# Patient Record
Sex: FEMALE | Race: WHITE
[De-identification: names, ages, dates, MRNs, and addresses within clinical notes are randomized per-mention and may not be internally consistent; named-entity substitution may affect disease eponyms.]

---

## 2017-02-14 ENCOUNTER — HOSPITAL ENCOUNTER (EMERGENCY)
Dept: HOSPITAL 61 - ER | Age: 42
Discharge: HOME | End: 2017-02-14
Payer: MEDICAID

## 2017-02-14 VITALS — BODY MASS INDEX: 34.15 KG/M2 | WEIGHT: 200 LBS | HEIGHT: 64 IN

## 2017-02-14 VITALS — DIASTOLIC BLOOD PRESSURE: 96 MMHG | SYSTOLIC BLOOD PRESSURE: 166 MMHG
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
## 2017-02-14 DIAGNOSIS — S63.602A: Primary | ICD-10-CM

## 2017-02-14 DIAGNOSIS — I10: ICD-10-CM
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
## 2017-02-14 PROCEDURE — 73130 X-RAY EXAM OF HAND: CPT

## 2017-02-14 PROCEDURE — 29125 APPL SHORT ARM SPLINT STATIC: CPT

## 2017-02-14 NOTE — PHYS DOC
Past Medical History


Past Medical History:  Hypertension, UTI


Past Surgical History:  No Surgical History


Additional Past Surgical Histo:  FX LEFT ANKLE PIN/PLATES


Alcohol Use:  None


Drug Use:  None





Adult General


Chief Complaint


Chief Complaint:  HAND PROBLEM





HPI


HPI


Patient is a 41  year old female who presents with left hand injury. Patient 

reports she is walking in the parking lot around 2 or 3 in the afternoon when 

she tripped over a curb and fell, hitting her left hand on the ground. She did 

not hit her head, no loss is consciousness. She presents now with pain in her 

left thumb. She denies pain elsewhere. She has not taken anything for pain. No 

other acute complaints.





Review of Systems


Review of Systems


Constitutional: Denies fever or chills


Respiratory: Denies cough or shortness of breath 


Cardiovascular: Denies chest pain


GI: Denies abdominal pain, nausea, vomiting, or diarrhea 


Musculoskeletal: L thumb pain


Neurologic: Denies headache, focal weakness or sensory changes





Current Medications


Current Medications





 Current Medications








 Medications


  (Trade)  Dose


 Ordered  Sig/Vadim  Start Time


 Stop Time Status Last Admin


Dose Admin


 


 Naproxen


  (Naprosyn)  500 mg  1X  ONCE  2/14/17 02:30


 2/14/17 02:31 DC 2/14/17 02:33


500 MG











Allergies


Allergies





 Allergies








Coded Allergies Type Severity Reaction Last Updated Verified


 


  No Known Drug Allergies    8/11/16 No











Physical Exam


Physical Exam


Constitutional: Well developed, well nourished, no acute distress, non-toxic 

appearance


HENT: Normocephalic, atraumatic


Eyes: EOMI, conjunctiva normal, no discharge


Neck: No stridor


Pulmonary: No respiratory distress


Skin: Warm, dry


Musculoskeletal: L thumb bruised, generally TTP over phalanges; no TTP in 

anatomic snuffbox; motor function (flexion/extension) intact; 2+ radial pulse, 

sensation to light touch intact


Neurologic: Alert and oriented X 3


Psychologic: Affect normal, judgement normal, mood normal





Current Patient Data


Vital Signs





 Vital Signs








  Date Time  Temp Pulse Resp B/P Pulse Ox O2 Delivery O2 Flow Rate FiO2


 


2/14/17 03:11  79 16 166/96 97 Room Air  


 


2/14/17 01:55 97.9       





 97.9       











EKG


EKG


[]





Radiology/Procedures


Radiology/Procedures


X-ray L hand (my read):


No acute fracture





Course & Med Decision Making


Course & Med Decision Making


Pertinent Labs and Imaging studies reviewed. (See chart for details)


Patient is 41-year-old female who presents with left thumb injury. Possible 

fracture versus sprain. We'll obtain x-rays to evaluate. Dose of naproxen 

ordered for pain control. X-ray without acute fracture per my read. Regardless 

will still splint with an aluminum splint over the thumb. Patient discharged 

with instructions for follow-up and return precautions.





Dragon Disclaimer


Dragon Disclaimer


This electronic medical record was generated, in whole or in part, using a 

voice recognition dictation system.





Departure


Departure


Impression:  


 Primary Impression:  


 Left thumb sprain


Disposition:  01 HOME, SELF-CARE


Condition:  STABLE


Referrals:  


MARION ROJAS MD (PCP)


Patient Instructions:  Thumb Sprain





Additional Instructions:


Thank you for allowing us to provide care today in the Emergency Department.


You can use naproxen, ibuprofen, or acetaminophen for any pain. Follow the 

directions on the label.


Schedule a follow up appointment with your primary care doctor. 


Return promptly to the Emergency Department if you develop any new or 

concerning symptoms.








NAYELI PATEL MD Feb 14, 2017 02:09

## 2017-02-14 NOTE — RAD
Portable left hand, 3 views, 2/14/2017:



History: Pain after a fall



There is mild cortical deformity along the margins of the proximal aspect of

the distal phalanx of the thumb. The appearance suggests a nondisplaced

fracture, of unclear age. No other fracture or dislocation is identified.





IMPRESSION: Nondisplaced fracture of the distal phalanx of the thumb, of

indeterminate age. If clinically indicated follow-up radiographs in 7-10 days

may be useful for further evaluation.







Note: The findings were called to personnel in the [facility] ER at 8:36 AM on

2/14/2017.

## 2019-01-02 ENCOUNTER — HOSPITAL ENCOUNTER (EMERGENCY)
Dept: HOSPITAL 61 - ER | Age: 44
Discharge: HOME | End: 2019-01-02
Payer: SELF-PAY

## 2019-01-02 VITALS — BODY MASS INDEX: 28.63 KG/M2 | WEIGHT: 200 LBS | HEIGHT: 70 IN

## 2019-01-02 VITALS — SYSTOLIC BLOOD PRESSURE: 168 MMHG | DIASTOLIC BLOOD PRESSURE: 82 MMHG

## 2019-01-02 VITALS — DIASTOLIC BLOOD PRESSURE: 60 MMHG | SYSTOLIC BLOOD PRESSURE: 158 MMHG

## 2019-01-02 DIAGNOSIS — S43.014A: Primary | ICD-10-CM
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
## 2019-01-02 DIAGNOSIS — S43.034A: ICD-10-CM

## 2019-01-02 DIAGNOSIS — I10: ICD-10-CM
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
## 2019-01-02 PROCEDURE — 96374 THER/PROPH/DIAG INJ IV PUSH: CPT

## 2019-01-02 PROCEDURE — 23650 CLTX SHO DSLC W/MNPJ WO ANES: CPT

## 2019-01-02 PROCEDURE — 73030 X-RAY EXAM OF SHOULDER: CPT

## 2019-01-02 PROCEDURE — 99285 EMERGENCY DEPT VISIT HI MDM: CPT

## 2019-01-02 NOTE — RAD
EXAM: AP and scapular Y views right shoulder

 

DATE: 1/2/2019 7:08 AM

 

INDICATION: RT SHOULDER PAIN/DISLOCATION AFTER FALL TODAY

 

COMPARISON: No Prior

 

FINDINGS/

IMPRESSION:

Anterior-inferior right glenohumeral joint dislocation. No definite 

associated fracture is seen although would be better assessed on post 

reduction images.

 

Electronically signed by: Som Sanders MD (1/2/2019 7:41 AM) [facility]

## 2019-01-02 NOTE — RAD
Right shoulder radiograph 1/2/2019 7:44 AM

 

INDICATION: Postreduction of the right shoulder

 

COMPARISON: Right shoulder radiograph January 2, 2018 at 0708 hours

 

TECHNIQUE:  2 views of the right shoulder are provided.

 

FINDINGS:

 

Interval reduction of the humeral head at the glenohumeral joint. 

Acromioclavicular joint appears well aligned. There may be subtle 

concavity of the posterior lateral right humeral head suggestive of a 

Hill-Sachs deformity. Mineralization along the lateral margin of the 

humeral head may reflect calcific tendinitis. Adjacent ribs are intact. No

significant soft tissue abnormality is identified.

 

IMPRESSION:

 

There may be subtle concavity of the posterior lateral right humeral head 

suggestive of a Hill-Sachs deformity status post reduction. Internal and 

external rotation views may be of benefit.

 

Mineralization along the lateral margin of the humeral head may reflect 

calcific tendinitis. 

 

Electronically signed by: Kayy Clark MD (1/2/2019 8:26 AM) 

[id number]

## 2019-01-02 NOTE — PHYS DOC
Past Medical History


Past Medical History:  Hypertension, UTI


Past Surgical History:  No Surgical History, Cholecystectomy


Additional Past Surgical Histo:  FX LEFT ANKLE PIN/PLATES, LEEP,


Alcohol Use:  None


Drug Use:  None





Adult General


Chief Complaint


Chief Complaint:  SHOULDER INJURY





HPI


HPI


Patient is a 43-year-old female presents to the emergency department for 

evaluation. She states that she stumbled, trying to avoid some boxes at the top 

of the stairs, and fell down 5 stairs and in the process dislocated her right 

shoulder. She has had 2 prior dislocations of her right shoulder, although not 

for 10 years. She denies any other painful areas or injuries. She denies any 

headache or neck pain, numbness, or weakness. There are no alleviating or 

exacerbating factors to her symptoms, except that movement of her right 

shoulder seems to worsen her pain.





Review of Systems


Review of Systems





Constitutional: Denies fever or chills []


Eyes: Denies change in visual acuity, redness, or eye pain []


HENT: Denies nasal congestion or sore throat []


Respiratory: Denies cough or shortness of breath []


Cardiovascular: The patient denies any shortness of breath, chest pain, 

palpitations, or orthopnea []


GI: Denies abdominal pain, nausea, vomiting, bloody stools or diarrhea []


: Denies dysuria or hematuria []


Musculoskeletal: Denies back pain or joint pain, except as noted in the history 

of present illness []


Integument: Denies rash or skin lesions []


Neurologic: Denies headache, focal weakness or sensory changes []


Endocrine: Denies polyuria or polydipsia []





All other systems were reviewed and found to be within normal limits, except as 

documented in this note.





Current Medications


Current Medications





Current Medications








 Medications


  (Trade)  Dose


 Ordered  Sig/Vadim  Start Time


 Stop Time Status Last Admin


Dose Admin


 


 Fentanyl Citrate


  (Fentanyl 2ml


 Vial)  100 mcg  1X  ONCE  1/2/19 08:00


 1/2/19 08:06 DC  





 


 Propofol  20 ml @ As


 Directed  [facility] ONCE  1/2/19 07:36


 1/2/19 07:38 DC  





 


 Sodium Chloride  1,000 ml @ 


 1,000 mls/hr  Q1H  1/2/19 07:59


 1/2/19 08:03 DC  














Allergies


Allergies





Allergies








Coded Allergies Type Severity Reaction Last Updated Verified


 


  No Known Drug Allergies    8/11/16 No











Physical Exam


Physical Exam


PHYSICAL EXAM:





CONSTITUTIONAL: Well developed, well nourished


HEAD: normocephalic, atraumatic


EENT: PERRL, EOMI. Conjunctivae normal color, sclerae non-icteric; moist mucous 

membranes.


NECK: Supple, non-tender; no meningismus.There is full, painless range of 

motion of the cervical spine, without any focal bony midline tenderness to 

palpation.


LUNGS: Lungs CTA, breathing even and unlabored. Normal air movement.


HEART: Regular rate and rhythm, no murmur


CHEST: No deformity; non-tender


ABDOMEN: The abdomen is soft, and non-tender, no masses or bruits.


EXTREM: Range of motion in the right shoulder is limited secondary to pain. 

There is fullness of the anterior aspect of the shoulder with flattening of the 

right deltoid suggestive of an anterior/inferior dislocation. Deltoid sensation 

is intact, normal range of motion in the right hand is present. The remainder 

the right upper extremity, except for the shoulder, is nontender. There is a 

strong radial pulse on the right. The remainder the extremities are unremarkable

, and atraumatic, with Normal ROM; no deformity, no calf tenderness. Normal 

pulses palpable in all extremities. There is no pedal edema.


SKIN: No rash; no diaphoresis


NEURO: Alert; normal speech and cognition; CN's grossly intact; strength 

grossly intact without focal deficit.


BACK: No CVA TTP.There is no bony tenderness to palpation of the thoracic or 

lumbar spine.





Current Patient Data


Vital Signs





 Vital Signs








  Date Time  Temp Pulse Resp B/P (MAP) Pulse Ox O2 Delivery O2 Flow Rate FiO2


 


1/2/19 07:23   19  99 Room Air  


 


1/2/19 06:33 98.2 74  160/87 (111)    





 98.2       











EKG


EKG


[]





Radiology/Procedures


Radiology/Procedures


[PROCEDURE: SHOULDER 2+V RIGHT





EXAM: AP and scapular Y views right shoulder


 


DATE: 1/2/2019 7:08 AM


 


INDICATION: RT SHOULDER PAIN/DISLOCATION AFTER FALL TODAY


 


COMPARISON: No Prior


 


FINDINGS/


IMPRESSION:


Anterior-inferior right glenohumeral joint dislocation. No definite 


associated fracture is seen although would be better assessed on post 


reduction images.]








PROCEDURE: SHOULDER 2+V RIGHT





Right shoulder radiograph 1/2/2019 7:44 AM


 


INDICATION: Postreduction of the right shoulder


 


COMPARISON: Right shoulder radiograph January 2, 2018 at 0708 hours


 


TECHNIQUE:  2 views of the right shoulder are provided.


 


FINDINGS:


 


Interval reduction of the humeral head at the glenohumeral joint. 


Acromioclavicular joint appears well aligned. There may be subtle 


concavity of the posterior lateral right humeral head suggestive of a 


Hill-Sachs deformity. Mineralization along the lateral margin of the 


humeral head may reflect calcific tendinitis. Adjacent ribs are intact. No


significant soft tissue abnormality is identified.


 


IMPRESSION:


 


There may be subtle concavity of the posterior lateral right humeral head 


suggestive of a Hill-Sachs deformity status post reduction. Internal and 


external rotation views may be of benefit.


 


Mineralization along the lateral margin of the humeral head may reflect 


calcific tendinitis.





Course & Med Decision Making


Course & Med Decision Making


Pertinent  Imaging studies reviewed. (See chart for details)





[JOINT REDUCTION PROCEDURE NOTE:]


After consent was obtained from the patient, the patient was placed on 

appropriate monitoring devices, and 50 mg of propofol was administered, 

resulting in moderate sedation. The right shoulder dislocation was reduced with 

adduction, slight external rotation and mild traction without difficulty. 

Patient tolerated procedure well. PMS intact postplacement. The patient will be 

monitored and released from the emergency department when fully awake. I did 

stress the importance of close orthopedic follow-up.





Dragon Disclaimer


Dragon Disclaimer


This electronic medical record was generated, in whole or in part, using a 

voice recognition dictation system.





Departure


Departure


Impression:  


 Primary Impression:  


 Shoulder dislocation


Disposition:  01 HOME, SELF-CARE


Condition:  STABLE


Referrals:  


HUMBERTO VAZ (PCP)








ABIMAEL OBANDO II, MD


Patient Instructions:  Shoulder Dislocation











MARION SMYTH MD Jan 2, 2019 06:47